# Patient Record
Sex: FEMALE | Race: WHITE | HISPANIC OR LATINO | Employment: UNEMPLOYED | URBAN - METROPOLITAN AREA
[De-identification: names, ages, dates, MRNs, and addresses within clinical notes are randomized per-mention and may not be internally consistent; named-entity substitution may affect disease eponyms.]

---

## 2018-04-30 ENCOUNTER — HOSPITAL ENCOUNTER (EMERGENCY)
Facility: HOSPITAL | Age: 23
Discharge: HOME | End: 2018-04-30
Attending: EMERGENCY MEDICINE
Payer: MEDICAID

## 2018-04-30 VITALS
HEART RATE: 110 BPM | RESPIRATION RATE: 18 BRPM | SYSTOLIC BLOOD PRESSURE: 113 MMHG | WEIGHT: 129 LBS | OXYGEN SATURATION: 100 % | TEMPERATURE: 98 F | DIASTOLIC BLOOD PRESSURE: 65 MMHG | BODY MASS INDEX: 25.19 KG/M2

## 2018-04-30 DIAGNOSIS — J06.9 UPPER RESPIRATORY TRACT INFECTION, UNSPECIFIED TYPE: Primary | ICD-10-CM

## 2018-04-30 DIAGNOSIS — Z34.90 PREGNANCY, UNSPECIFIED GESTATIONAL AGE: ICD-10-CM

## 2018-04-30 LAB — DEPRECATED S PYO AG THROAT QL EIA: NEGATIVE

## 2018-04-30 PROCEDURE — 99283 EMERGENCY DEPT VISIT LOW MDM: CPT

## 2018-04-30 PROCEDURE — 87880 STREP A ASSAY W/OPTIC: CPT

## 2018-04-30 PROCEDURE — 87081 CULTURE SCREEN ONLY: CPT

## 2018-04-30 RX ORDER — CETIRIZINE HYDROCHLORIDE 10 MG/1
10 TABLET ORAL DAILY
Qty: 30 TABLET | Refills: 0 | COMMUNITY
Start: 2018-04-30 | End: 2019-04-30

## 2018-04-30 NOTE — ED PROVIDER NOTES
Encounter Date: 4/30/2018       History     Chief Complaint   Patient presents with    URI     Pt complains of sore throat + nasal congestion for 3-4 days. Pt states she is 3 months pregnant     Fever     Patient currently presents with a chief complaint of cough.  Onset was noted 3-4days ago.  There is associated rhinorrhea and congestion.  Subjective fever noted though chills are denied.  Patient did not check with a thermometer.  Vomiting and diarrhea are denied.  Over-the-counter remedies have not been attempted.  There has not been purulent nasal discharge. Cough has been nonproductive.  NOTE patient reports she is currently 12 weeks pregnant.          Review of patient's allergies indicates:  No Known Allergies  History reviewed. No pertinent past medical history.  History reviewed. No pertinent surgical history.  History reviewed. No pertinent family history.  Social History   Substance Use Topics    Smoking status: Never Smoker    Smokeless tobacco: Not on file    Alcohol use No     Review of Systems   Constitutional: Positive for fever. Negative for chills.   HENT: Positive for postnasal drip, rhinorrhea and sore throat. Negative for congestion.    Respiratory: Positive for cough. Negative for chest tightness, shortness of breath and wheezing.    Cardiovascular: Negative for chest pain, palpitations and leg swelling.   Gastrointestinal: Negative for abdominal pain, constipation, diarrhea, nausea and vomiting.   Genitourinary: Negative for dysuria, frequency, urgency, vaginal bleeding and vaginal discharge.   Musculoskeletal: Negative for back pain.   Skin: Negative for color change and rash.   Allergic/Immunologic: Negative for immunocompromised state.   Neurological: Negative for dizziness, weakness and numbness.   Hematological: Negative for adenopathy. Does not bruise/bleed easily.   All other systems reviewed and are negative.      Physical Exam     Initial Vitals [04/30/18 0048]   BP Pulse Resp  Temp SpO2   113/65 110 18 98 °F (36.7 °C) 100 %      MAP       81         Physical Exam    Nursing note and vitals reviewed.  Constitutional: She appears well-developed and well-nourished. She is not diaphoretic. No distress.   HENT:   Head: Normocephalic and atraumatic.   Right Ear: External ear normal.   Left Ear: External ear normal.   Nose: Rhinorrhea present.   Mouth/Throat: Oropharynx is clear and moist.   Eyes: Conjunctivae and EOM are normal. Pupils are equal, round, and reactive to light. No scleral icterus.   Neck: Neck supple. No tracheal deviation present. No JVD present.   Cardiovascular: Normal rate, regular rhythm, normal heart sounds and intact distal pulses. Exam reveals no gallop and no friction rub.    No murmur heard.  Pulmonary/Chest: Breath sounds normal. No respiratory distress. She has no wheezes. She has no rhonchi. She has no rales.   Abdominal: Soft. Bowel sounds are normal. She exhibits no distension. There is no tenderness.   Musculoskeletal: Normal range of motion. She exhibits no edema.   Neurological: She is alert and oriented to person, place, and time. She has normal strength. No cranial nerve deficit or sensory deficit.   Skin: Skin is warm and dry. No rash noted.   Psychiatric: She has a normal mood and affect. Her behavior is normal.         ED Course   Procedures  Labs Reviewed   THROAT SCREEN, RAPID             Medical Decision Making:   ED Management:  All findings were reviewed with the patient/family in detail along with the diagnosis of URI.  Patient/family has been advised to use an appropriate antihistamine and Tylenol for symptomatic relief pending resolution and PCP follow-up.  I see no indication of an emergent process beyond that addressed during our encounter but have duly counseled the patient/family regarding the need for prompt follow-up as well as the indications that should prompt immediate return to the emergency room should new or worrisome developments occur.   The patient/family communicates understanding of all this information and all remaining questions and concerns were addressed at this time.                          Clinical Impression:   The primary encounter diagnosis was Upper respiratory tract infection, unspecified type. A diagnosis of Pregnancy, unspecified gestational age was also pertinent to this visit.                           Leobardo Street MD  04/30/18 0431

## 2018-05-02 LAB — BACTERIA THROAT CULT: NORMAL
